# Patient Record
Sex: FEMALE | Race: ASIAN | NOT HISPANIC OR LATINO | URBAN - METROPOLITAN AREA
[De-identification: names, ages, dates, MRNs, and addresses within clinical notes are randomized per-mention and may not be internally consistent; named-entity substitution may affect disease eponyms.]

---
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
## 2022-07-19 ENCOUNTER — CONSULT EP (OUTPATIENT)
Dept: URBAN - METROPOLITAN AREA CLINIC 39 | Facility: CLINIC | Age: 22
End: 2022-07-19

## 2022-07-19 DIAGNOSIS — H43.393: ICD-10-CM

## 2022-07-19 DIAGNOSIS — H43.313: ICD-10-CM

## 2022-07-19 DIAGNOSIS — H35.462: ICD-10-CM

## 2022-07-19 PROCEDURE — 92250 FUNDUS PHOTOGRAPHY W/I&R: CPT

## 2022-07-19 PROCEDURE — 92134 CPTRZ OPH DX IMG PST SGM RTA: CPT

## 2022-07-19 PROCEDURE — 92014 COMPRE OPH EXAM EST PT 1/>: CPT

## 2022-07-19 PROCEDURE — 92201 OPSCPY EXTND RTA DRAW UNI/BI: CPT

## 2022-07-19 ASSESSMENT — TONOMETRY
OS_IOP_MMHG: 15
OD_IOP_MMHG: 13

## 2022-07-19 ASSESSMENT — VISUAL ACUITY
OD_CC: 20/25-2
OS_CC: 20/25

## 2024-03-05 ENCOUNTER — HOSPITAL ENCOUNTER (EMERGENCY)
Facility: HOSPITAL | Age: 24
Discharge: HOME | End: 2024-03-05
Attending: EMERGENCY MEDICINE

## 2024-03-05 VITALS
HEART RATE: 58 BPM | DIASTOLIC BLOOD PRESSURE: 62 MMHG | RESPIRATION RATE: 18 BRPM | SYSTOLIC BLOOD PRESSURE: 112 MMHG | TEMPERATURE: 97.6 F | OXYGEN SATURATION: 100 %

## 2024-03-05 DIAGNOSIS — R55 SYNCOPE: Primary | ICD-10-CM

## 2024-03-05 LAB
ATRIAL RATE: 44 BPM
BACTERIA UR QL AUTO: ABNORMAL /HPF
BASOPHILS # BLD AUTO: 0.04 THOUSANDS/ÂΜL (ref 0–0.1)
BASOPHILS NFR BLD AUTO: 1 % (ref 0–1)
BILIRUB UR QL STRIP: NEGATIVE
CLARITY UR: ABNORMAL
COLOR UR: YELLOW
EOSINOPHIL # BLD AUTO: 0.11 THOUSAND/ÂΜL (ref 0–0.61)
EOSINOPHIL NFR BLD AUTO: 2 % (ref 0–6)
ERYTHROCYTE [DISTWIDTH] IN BLOOD BY AUTOMATED COUNT: 13.9 % (ref 11.6–15.1)
EXT PREGNANCY TEST URINE: NEGATIVE
EXT. CONTROL: NORMAL
GLUCOSE UR STRIP-MCNC: NEGATIVE MG/DL
HCT VFR BLD AUTO: 36.2 % (ref 34.8–46.1)
HGB BLD-MCNC: 11.8 G/DL (ref 11.5–15.4)
HGB UR QL STRIP.AUTO: ABNORMAL
IMM GRANULOCYTES # BLD AUTO: 0.02 THOUSAND/UL (ref 0–0.2)
IMM GRANULOCYTES NFR BLD AUTO: 0 % (ref 0–2)
KETONES UR STRIP-MCNC: NEGATIVE MG/DL
LEUKOCYTE ESTERASE UR QL STRIP: ABNORMAL
LYMPHOCYTES # BLD AUTO: 1.86 THOUSANDS/ÂΜL (ref 0.6–4.47)
LYMPHOCYTES NFR BLD AUTO: 39 % (ref 14–44)
MCH RBC QN AUTO: 27.3 PG (ref 26.8–34.3)
MCHC RBC AUTO-ENTMCNC: 32.6 G/DL (ref 31.4–37.4)
MCV RBC AUTO: 84 FL (ref 82–98)
MONOCYTES # BLD AUTO: 0.5 THOUSAND/ÂΜL (ref 0.17–1.22)
MONOCYTES NFR BLD AUTO: 11 % (ref 4–12)
MUCOUS THREADS UR QL AUTO: ABNORMAL
NEUTROPHILS # BLD AUTO: 2.21 THOUSANDS/ÂΜL (ref 1.85–7.62)
NEUTS SEG NFR BLD AUTO: 47 % (ref 43–75)
NITRITE UR QL STRIP: NEGATIVE
NON-SQ EPI CELLS URNS QL MICRO: ABNORMAL /HPF
NRBC BLD AUTO-RTO: 0 /100 WBCS
P AXIS: 55 DEGREES
PH UR STRIP.AUTO: 7 [PH] (ref 4.5–8)
PLATELET # BLD AUTO: 328 THOUSANDS/UL (ref 149–390)
PMV BLD AUTO: 10.4 FL (ref 8.9–12.7)
PR INTERVAL: 192 MS
PROT UR STRIP-MCNC: ABNORMAL MG/DL
QRS AXIS: 70 DEGREES
QRSD INTERVAL: 76 MS
QT INTERVAL: 474 MS
QTC INTERVAL: 405 MS
RBC # BLD AUTO: 4.32 MILLION/UL (ref 3.81–5.12)
RBC #/AREA URNS AUTO: ABNORMAL /HPF
SP GR UR STRIP.AUTO: 1.02 (ref 1–1.03)
T WAVE AXIS: 64 DEGREES
UROBILINOGEN UR QL STRIP.AUTO: 0.2 E.U./DL
VENTRICULAR RATE: 44 BPM
WBC # BLD AUTO: 4.74 THOUSAND/UL (ref 4.31–10.16)
WBC #/AREA URNS AUTO: ABNORMAL /HPF

## 2024-03-05 PROCEDURE — 93010 ELECTROCARDIOGRAM REPORT: CPT | Performed by: INTERNAL MEDICINE

## 2024-03-05 PROCEDURE — 81001 URINALYSIS AUTO W/SCOPE: CPT

## 2024-03-05 PROCEDURE — 99284 EMERGENCY DEPT VISIT MOD MDM: CPT

## 2024-03-05 PROCEDURE — 93005 ELECTROCARDIOGRAM TRACING: CPT

## 2024-03-05 PROCEDURE — 87147 CULTURE TYPE IMMUNOLOGIC: CPT

## 2024-03-05 PROCEDURE — 36415 COLL VENOUS BLD VENIPUNCTURE: CPT | Performed by: EMERGENCY MEDICINE

## 2024-03-05 PROCEDURE — 99285 EMERGENCY DEPT VISIT HI MDM: CPT | Performed by: EMERGENCY MEDICINE

## 2024-03-05 PROCEDURE — 81025 URINE PREGNANCY TEST: CPT | Performed by: EMERGENCY MEDICINE

## 2024-03-05 PROCEDURE — 85025 COMPLETE CBC W/AUTO DIFF WBC: CPT | Performed by: EMERGENCY MEDICINE

## 2024-03-05 PROCEDURE — 87086 URINE CULTURE/COLONY COUNT: CPT

## 2024-03-05 NOTE — ED NOTES
Pt was medical emergency. BG ck at that time was in the 70's. Provider aware      Tran Malin, RN  03/05/24 1016

## 2024-03-05 NOTE — Clinical Note
Nery Ledbetter was seen and treated in our emergency department on 3/5/2024.                Diagnosis:     Nery  .    She may return on this date:     Nery is medically cleared to return to clinicals/school tomorrow, 3/6/24     If you have any questions or concerns, please don't hesitate to call.      Janet Lombardo, DO    ______________________________           _______________          _______________  Hospital Representative                              Date                                Time

## 2024-03-05 NOTE — ED PROVIDER NOTES
History  Chief Complaint   Patient presents with    Syncope     Pt reports having the flu last week with N/V. Pt recovered and is eating/drinking normally. Pt did not eat today but that is normal for her. Pt did strike her head.      A 22 yo female with pmhx of anemia; presents following a syncopal episode.  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  Pt did fall to the ground.  Upon arrival to the ED, pt reports to feeling better although still has mild lightheadedness and nausea.  Pt reports she was sick with the flu over the past two weeks, however has since recovered and is able to tolerate a regular diet.  Pt otherwise denies fever, chills, chest pain, SOB, abd pain, N/V/D, peripheral edema and rashes.       History provided by:  Patient and medical records      None       Past Medical History:   Diagnosis Date    Cellulitis        Past Surgical History:   Procedure Laterality Date    TONSILLECTOMY         History reviewed. No pertinent family history.  I have reviewed and agree with the history as documented.    E-Cigarette/Vaping    E-Cigarette Use Never User      E-Cigarette/Vaping Substances    Nicotine No     THC No     CBD No     Flavoring No     Other No     Unknown No      Social History     Tobacco Use    Smoking status: Never    Smokeless tobacco: Never   Vaping Use    Vaping status: Never Used   Substance Use Topics    Alcohol use: Not Currently    Drug use: Not Currently       Review of Systems   Neurological:  Positive for syncope.   All other systems reviewed and are negative.      Physical Exam  Physical Exam  General Appearance: alert and oriented, nad, non toxic appearing  Skin:  Warm, dry, intact.  No cyanosis  HEENT: Atraumatic, normocephalic.  No eye drainage.  Normal hearing.  Moist mucous membranes.    Neck: Supple, trachea midline  Cardiac: regular rhythm, bradycardic in the 40's; no murmurs, rub, gallops.   No pedal edema, 2+ pulses  Pulmonary: lungs CTAB; no wheezes, rales, rhonchi  Gastrointestinal: abdomen soft, nontender, nondistended; no guarding or rebound tenderness; good bowel sounds, no mass or bruits  Extremities:  No deformities.  No calf tenderness, no clubbing  Neuro:  no focal motor or sensory deficits, CN 2-12 grossly intact  Psych:  Normal mood and affect, normal judgement and insight      Vital Signs  ED Triage Vitals   Temperature Pulse Respirations Blood Pressure SpO2   03/05/24 0817 03/05/24 0809 03/05/24 0809 03/05/24 0809 03/05/24 0809   97.6 °F (36.4 °C) (!) 54 16 101/70 100 %      Temp Source Heart Rate Source Patient Position - Orthostatic VS BP Location FiO2 (%)   03/05/24 0817 03/05/24 0809 03/05/24 0809 03/05/24 0809 --   Oral Monitor Lying Right arm       Pain Score       03/05/24 0825       4           Vitals:    03/05/24 0809 03/05/24 1000 03/05/24 1008 03/05/24 1045   BP: 101/70 95/51  112/62   Pulse: (!) 54 (!) 46 (S) 61 58   Patient Position - Orthostatic VS: Lying Sitting  Sitting         Visual Acuity  Visual Acuity      Flowsheet Row Most Recent Value   L Pupil Size (mm) 5   R Pupil Size (mm) 5            ED Medications  Medications - No data to display    Diagnostic Studies  Results Reviewed       Procedure Component Value Units Date/Time    Urine Microscopic [[id number]]  (Abnormal) Collected: 03/05/24 1043    Lab Status: Final result Specimen: Urine, Clean Catch Updated: 03/05/24 1055     RBC, UA Innumerable /hpf      WBC, UA Innumerable /hpf      Epithelial Cells Moderate /hpf      Bacteria, UA Occasional /hpf      MUCUS THREADS Innumerable    Urine culture [[id number]] Collected: 03/05/24 1043    Lab Status: In process Specimen: Urine, Clean Catch Updated: 03/05/24 1055    POCT pregnancy, urine [[id number]]  (Normal) Resulted: 03/05/24 1051    Lab Status: Final result Updated: 03/05/24 1051     EXT Preg Test, Ur Negative     Control Valid    Urine Macroscopic, POC [[id number]]   (Abnormal) Collected: 03/05/24 1043    Lab Status: Final result Specimen: Urine Updated: 03/05/24 1044     Color, UA Yellow     Clarity, UA Cloudy     pH, UA 7.0     Leukocytes, UA Small     Nitrite, UA Negative     Protein, UA 30 (1+) mg/dl      Glucose, UA Negative mg/dl      Ketones, UA Negative mg/dl      Urobilinogen, UA 0.2 E.U./dl      Bilirubin, UA Negative     Occult Blood, UA Large     Specific Gravity, UA 1.025    Narrative:      CLINITEK RESULT    CBC and differential [[id number]] Collected: 03/05/24 0822    Lab Status: Final result Specimen: Blood from Hand, Right Updated: 03/05/24 0830     WBC 4.74 Thousand/uL      RBC 4.32 Million/uL      Hemoglobin 11.8 g/dL      Hematocrit 36.2 %      MCV 84 fL      MCH 27.3 pg      MCHC 32.6 g/dL      RDW 13.9 %      MPV 10.4 fL      Platelets 328 Thousands/uL      nRBC 0 /100 WBCs      Neutrophils Relative 47 %      Immat GRANS % 0 %      Lymphocytes Relative 39 %      Monocytes Relative 11 %      Eosinophils Relative 2 %      Basophils Relative 1 %      Neutrophils Absolute 2.21 Thousands/µL      Immature Grans Absolute 0.02 Thousand/uL      Lymphocytes Absolute 1.86 Thousands/µL      Monocytes Absolute 0.50 Thousand/µL      Eosinophils Absolute 0.11 Thousand/µL      Basophils Absolute 0.04 Thousands/µL                    No orders to display              Procedures  Procedures   ECG 12 Lead Documentation  Date/Time: today/date: 3/6/2024  Performed by: Janet Lombardo    ECG reviewed by me, the ED Provider: yes    Patient location:  ED   Previous ECG:  No old for comparison   Rate:  44  ECG rate assessment: normal    Rhythm: sinus bradycardia    Ectopy:  none    QRS axis:  Normal  Intervals: normal   Q waves: None   ST segments:  Normal  T waves: normal      Impression: Sinus bradycardia, otherwise normal EKG       ED Course  ED Course as of 03/06/24 0849   Tue Mar 05, 2024   0838 CBC and differential  WNL   1012 Pt feeling better at this time, able to ambulate.   Pending urine   1056 Urine Microscopic(!)  Pt reports being on her menses.  Urine negative for infection   1056 PREGNANCY TEST URINE: Negative  Negative   1109 Pt reports continued improvement in her symptoms, will proceed with discharge home.                               SBIRT 22yo+      Flowsheet Row Most Recent Value   Initial Alcohol Screen: US AUDIT-C     1. How often do you have a drink containing alcohol? 0 Filed at: 03/05/2024 0818   2. How many drinks containing alcohol do you have on a typical day you are drinking?  0 Filed at: 03/05/2024 0818   3a. Male UNDER 65: How often do you have five or more drinks on one occasion? 0 Filed at: 03/05/2024 0818   3b. FEMALE Any Age, or MALE 65+: How often do you have 4 or more drinks on one occassion? 0 Filed at: 03/05/2024 0818   Audit-C Score 0 Filed at: 03/05/2024 0818   [place]: How many times in the past year have you...    Used an illegal drug or used a prescription medication for non-medical reasons? Never Filed at: 03/05/2024 0818                      Medical Decision Making  A 22 yo female presents following a syncopal episode.  She reports mild lightheadedness and nausea at this time, although states her symptoms are improving.  Symptoms likely secondary to vasovagal event.  Patient currently resting comfortably, in no acute distress.  Exam is atraumatic.  Fingerstick glucose 78 (obtained during medical emergency).  Will check EKG for arrhythmia.  Will check CBC for anemia.  Will provide breakfast and reassess.    CBC and EKG reassuring.  Patient tolerated breakfast, she reports to feeling better and is able to ambulate without recurrent symptoms.  Will proceed with discharge home.    Amount and/or Complexity of Data Reviewed  Labs: ordered. Decision-making details documented in ED Course.             Disposition  Final diagnoses:   Syncope     Time reflects when diagnosis was documented in both MDM as applicable and the Disposition within this note       Time  User Action Codes Description Comment    3/5/2024 10:57 Janet Mccracken Add [R55] Syncope           ED Disposition       ED Disposition   Discharge    Condition   Stable    Date/Time   Tue Mar 5, 2024 1057    Comment   Nery Ledbetter discharge to home/self care.                   Follow-up Information       Follow up With Specialties Details Why Contact Info Additional Information    [facility] Emergency Department Emergency Medicine Go to  If symptoms worsen [address] [place] [id number]  [phone] [facility] Emergency Department, [address] [place] [address], [id number]            There are no discharge medications for this patient.      No discharge procedures on file.    PDMP Review       None            ED Provider  Electronically Signed by             Janet Lombardo DO  03/06/24 0849

## 2024-03-06 LAB
BACTERIA UR CULT: ABNORMAL
BACTERIA UR CULT: ABNORMAL

## 2024-03-07 DIAGNOSIS — N39.0 URINARY TRACT INFECTION: Primary | ICD-10-CM

## 2024-03-07 RX ORDER — CEPHALEXIN 500 MG/1
500 CAPSULE ORAL EVERY 12 HOURS SCHEDULED
Qty: 10 CAPSULE | Refills: 0 | Status: SHIPPED | OUTPATIENT
Start: 2024-03-07 | End: 2024-03-12